# Patient Record
Sex: FEMALE | Race: WHITE | NOT HISPANIC OR LATINO | Employment: FULL TIME | URBAN - NONMETROPOLITAN AREA
[De-identification: names, ages, dates, MRNs, and addresses within clinical notes are randomized per-mention and may not be internally consistent; named-entity substitution may affect disease eponyms.]

---

## 2024-10-20 ENCOUNTER — HOSPITAL ENCOUNTER (EMERGENCY)
Facility: HOSPITAL | Age: 24
Discharge: HOME | End: 2024-10-20

## 2024-10-20 VITALS
RESPIRATION RATE: 16 BRPM | HEART RATE: 87 BPM | OXYGEN SATURATION: 100 % | HEIGHT: 67 IN | WEIGHT: 190 LBS | BODY MASS INDEX: 29.82 KG/M2 | TEMPERATURE: 97.1 F | SYSTOLIC BLOOD PRESSURE: 138 MMHG | DIASTOLIC BLOOD PRESSURE: 83 MMHG

## 2024-10-20 DIAGNOSIS — S61.011A LACERATION OF RIGHT THUMB WITHOUT FOREIGN BODY WITHOUT DAMAGE TO NAIL, INITIAL ENCOUNTER: Primary | ICD-10-CM

## 2024-10-20 PROCEDURE — 99283 EMERGENCY DEPT VISIT LOW MDM: CPT

## 2024-10-20 PROCEDURE — 12001 RPR S/N/AX/GEN/TRNK 2.5CM/<: CPT | Performed by: PHYSICIAN ASSISTANT

## 2024-10-20 ASSESSMENT — PAIN SCALES - GENERAL: PAINLEVEL_OUTOF10: 0 - NO PAIN

## 2024-10-20 ASSESSMENT — COLUMBIA-SUICIDE SEVERITY RATING SCALE - C-SSRS
1. IN THE PAST MONTH, HAVE YOU WISHED YOU WERE DEAD OR WISHED YOU COULD GO TO SLEEP AND NOT WAKE UP?: NO
6. HAVE YOU EVER DONE ANYTHING, STARTED TO DO ANYTHING, OR PREPARED TO DO ANYTHING TO END YOUR LIFE?: NO
2. HAVE YOU ACTUALLY HAD ANY THOUGHTS OF KILLING YOURSELF?: NO

## 2024-10-20 ASSESSMENT — PAIN - FUNCTIONAL ASSESSMENT: PAIN_FUNCTIONAL_ASSESSMENT: 0-10

## 2024-10-20 NOTE — DISCHARGE INSTRUCTIONS
Keep the wound clean and dry monitor for any signs of infection including increased redness swelling red streaking or increased pain    Change the dressing daily  Sutures out in 6 to 7 days

## 2024-10-20 NOTE — ED PROVIDER NOTES
HPI   Chief Complaint   Patient presents with   • Finger Laceration     States she cut her right thumb on an apple slicer about 30 minutes ago at home.        24-year-old female here with linear laceration along the right thumb cut it on an apple lacer earlier today last tetanus was 4 years ago    Denies any other injuries    Along the dorsal proximal aspect of the right thumb              Patient History   Past Medical History:   Diagnosis Date   • Crushing injury of unspecified finger(s), initial encounter 04/25/2018    Crushing injury of finger, right   • Fracture of unspecified phalanx of unspecified finger, initial encounter for closed fracture 05/23/2018    Nondisplaced fracture of phalanx of finger   • Ingrowing nail 12/17/2018    Ingrown left greater toenail   • Nondisplaced fracture of distal phalanx of right ring finger, initial encounter for closed fracture 04/30/2018    Closed nondisplaced fracture of distal phalanx of right ring finger, initial encounter   • Other conditions influencing health status 07/24/2013    Cellulitis Of The Left Toes   • Pediculosis due to pediculus humanus capitis 07/22/2014    Pediculosis capitis   • Pediculosis due to pediculus humanus capitis 06/07/2014    Head lice   • Personal history of diseases of the skin and subcutaneous tissue 10/21/2016    History of pityriasis rosea   • Personal history of diseases of the skin and subcutaneous tissue 06/08/2015    History of eczema   • Personal history of other infectious and parasitic diseases 07/19/2018    History of tinea corporis   • Unspecified contact dermatitis due to plants, except food 06/14/2014    Rhus dermatitis   • Unspecified injury of unspecified foot, initial encounter 04/18/2013    Foot injury     No past surgical history on file.  No family history on file.  Social History     Tobacco Use   • Smoking status: Not on file   • Smokeless tobacco: Not on file   Substance Use Topics   • Alcohol use: Not on file   • Drug  use: Not on file       Physical Exam   ED Triage Vitals [10/20/24 1532]   Temperature Heart Rate Respirations BP   36.2 °C (97.1 °F) 87 16 138/83      Pulse Ox Temp Source Heart Rate Source Patient Position   100 % Temporal Monitor Sitting      BP Location FiO2 (%)     Left arm --       Physical Exam  Vitals and nursing note reviewed.   Constitutional:       Appearance: Normal appearance.   HENT:      Head: Normocephalic and atraumatic.      Right Ear: External ear normal.      Left Ear: External ear normal.   Cardiovascular:      Rate and Rhythm: Normal rate.   Pulmonary:      Effort: Pulmonary effort is normal.      Breath sounds: Normal breath sounds.   Abdominal:      General: Abdomen is flat. Bowel sounds are normal.      Palpations: Abdomen is soft.   Musculoskeletal:      Comments: No bony tenderness with palp full range of motion against flexion and extension  Patient with 2 cm linear laceration to the dorsal proximal aspect of the right thumb   Skin:     General: Skin is warm and dry.      Capillary Refill: Capillary refill takes less than 2 seconds.      Comments: 2 cm linear laceration along the dorsal proximal aspect of the first phalanx on the right hand   Neurological:      General: No focal deficit present.      Mental Status: She is alert and oriented to person, place, and time.           ED Course & MDM   Diagnoses as of 10/20/24 1629   Laceration of right thumb without foreign body without damage to nail, initial encounter                 No data recorded                                 Medical Decision Making  Patient with linear laceration on her right thumb when she is attempting to use an apple slicer the plastic part broke and one of the metal parts that her finger causing her to cut her right dorsal finger  Full range of motion against resistance with flexion and extension  Wound cleansed with wound  and irrigated with normal saline 3 sutures placed 4.0  Patient tolerated  "well  Stressed close follow-up with primary doctor to keep the wound clean and dry  If she has any increased pain discomfort increased redness red streaking or other symptoms she needs to get rechecked      Differential diagnosis \"finger laceration finger fracture abrasion\"            Procedure  Laceration Repair    Performed by: Maribell Serrano PA-C  Authorized by: Maribell Serrano PA-C    Consent:     Consent obtained:  Verbal    Consent given by:  Patient    Risks, benefits, and alternatives were discussed: yes      Risks discussed:  Infection, pain and retained foreign body    Alternatives discussed:  No treatment, delayed treatment and observation  Universal protocol:     Procedure explained and questions answered to patient or proxy's satisfaction: yes      Immediately prior to procedure, a time out was called: yes      Patient identity confirmed:  Verbally with patient  Anesthesia:     Anesthesia method:  Nerve block    Block needle gauge:  27 G    Block anesthetic:  Lidocaine 1% w/o epi    Block technique:  Digital block right thumb    Block injection procedure:  Anatomic landmarks identified, introduced needle, incremental injection, anatomic landmarks palpated and negative aspiration for blood    Block outcome:  Anesthesia achieved  Laceration details:     Location:  Finger    Finger location:  R thumb    Length (cm):  2    Depth (mm):  3  Pre-procedure details:     Preparation:  Patient was prepped and draped in usual sterile fashion  Exploration:     Limited defect created (wound extended): no      Hemostasis achieved with:  Direct pressure    Imaging outcome: foreign body not noted      Wound exploration: wound explored through full range of motion      Wound extent: no fascia violation noted, no foreign bodies/material noted, no muscle damage noted, no nerve damage noted, no tendon damage noted, no underlying fracture noted and no vascular damage noted      Contaminated: no    Treatment:     Area cleansed " with:  Chlorhexidine    Amount of cleaning:  Standard    Irrigation solution:  Sterile saline    Irrigation volume:  20    Irrigation method:  Syringe    Visualized foreign bodies/material removed: no      Undermining:  None  Skin repair:     Repair method:  Sutures    Suture size:  4-0    Suture material:  Nylon    Suture technique:  Simple interrupted    Number of sutures:  3  Approximation:     Approximation:  Close  Repair type:     Repair type:  Simple  Post-procedure details:     Dressing:  Antibiotic ointment and bulky dressing    Procedure completion:  Tolerated       Maribell Serrano PA-C  10/20/24 1800

## 2024-10-27 ENCOUNTER — HOSPITAL ENCOUNTER (EMERGENCY)
Facility: HOSPITAL | Age: 24
Discharge: HOME | End: 2024-10-27

## 2024-10-27 VITALS
WEIGHT: 190 LBS | HEIGHT: 67 IN | RESPIRATION RATE: 16 BRPM | DIASTOLIC BLOOD PRESSURE: 93 MMHG | TEMPERATURE: 97.8 F | OXYGEN SATURATION: 100 % | BODY MASS INDEX: 29.82 KG/M2 | HEART RATE: 94 BPM | SYSTOLIC BLOOD PRESSURE: 145 MMHG

## 2024-10-27 DIAGNOSIS — Z48.02 VISIT FOR SUTURE REMOVAL: ICD-10-CM

## 2024-10-27 DIAGNOSIS — Z51.89 VISIT FOR WOUND CHECK: Primary | ICD-10-CM

## 2024-10-27 ASSESSMENT — COLUMBIA-SUICIDE SEVERITY RATING SCALE - C-SSRS
6. HAVE YOU EVER DONE ANYTHING, STARTED TO DO ANYTHING, OR PREPARED TO DO ANYTHING TO END YOUR LIFE?: NO
2. HAVE YOU ACTUALLY HAD ANY THOUGHTS OF KILLING YOURSELF?: NO
1. IN THE PAST MONTH, HAVE YOU WISHED YOU WERE DEAD OR WISHED YOU COULD GO TO SLEEP AND NOT WAKE UP?: NO

## 2024-10-27 ASSESSMENT — PAIN SCALES - GENERAL: PAINLEVEL_OUTOF10: 0 - NO PAIN

## 2024-10-27 ASSESSMENT — PAIN - FUNCTIONAL ASSESSMENT: PAIN_FUNCTIONAL_ASSESSMENT: 0-10

## 2025-05-28 ENCOUNTER — OFFICE VISIT (OUTPATIENT)
Dept: URGENT CARE | Facility: URGENT CARE | Age: 25
End: 2025-05-28
Payer: COMMERCIAL

## 2025-05-28 VITALS
OXYGEN SATURATION: 99 % | WEIGHT: 189.6 LBS | DIASTOLIC BLOOD PRESSURE: 89 MMHG | HEART RATE: 70 BPM | SYSTOLIC BLOOD PRESSURE: 127 MMHG | RESPIRATION RATE: 18 BRPM | TEMPERATURE: 98 F | BODY MASS INDEX: 29.69 KG/M2

## 2025-05-28 DIAGNOSIS — H60.11 CELLULITIS OF RIGHT EAR: Primary | ICD-10-CM

## 2025-05-28 RX ORDER — SULFAMETHOXAZOLE AND TRIMETHOPRIM 800; 160 MG/1; MG/1
1 TABLET ORAL 2 TIMES DAILY
Qty: 10 TABLET | Refills: 0 | Status: SHIPPED | OUTPATIENT
Start: 2025-05-28 | End: 2025-06-02

## 2025-05-28 NOTE — PATIENT INSTRUCTIONS
VISIT SUMMARY:  Today, you came in because your ear was swollen and irritated, and you suspected it might be due to insect bites. You noticed the swelling and small bumps on your ear yesterday morning, and it has worsened since then. You mentioned that the ear feels hot and irritated but not itchy. There is no history of new ear piercings, and you have no known allergies to antibiotics or history of diabetes.    YOUR PLAN:  -EAR INFECTION: Your ear swelling and irritation are likely due to an insect bite that has caused a secondary infection. This means that the bite has led to an infection in the area. To address this, you have been prescribed oral Bactrim, an antibiotic, to help reduce the swelling and treat the infection. Additionally, you should apply ice to the affected area to help reduce the swelling. Please monitor the swelling, and if it persists, return for reassessment.    INSTRUCTIONS:  Please take the prescribed oral Bactrim as directed. Apply ice to your ear to help reduce the swelling. If the swelling does not improve or gets worse, please return for a follow-up appointment.

## 2025-05-28 NOTE — PROGRESS NOTES
"Subjective   Patient ID: Dk Solitario is a 24 y.o. female who presents for Other (Skin issue on (R) ear, patient thinks she may have been bit by a spider 2 days ago. Red, irritated, feels \"hot and itchy.\" ).  History of Present Illness  Dk Solitario is a 24 year old female who presents with a swollen and irritated ear with suspected insect bites.    She noticed swelling and small bumps on her ear yesterday morning, which have worsened, prompting her to seek medical attention today. The ear feels hot and irritated but not itchy. She suspects a spider bite, although she does not recall being bitten. The bumps were not present before yesterday.    There is no history of new ear piercings. She has been touching the area, which may have contributed to the irritation. The swelling is limited to one ear, with no similar symptoms elsewhere on her body. No known allergies to antibiotics and no history of diabetes.    No itchiness, systemic symptoms, or other areas of swelling or bumps.    ROS is negative unless otherwise stated in HPI.       Objective     /89   Pulse 70   Temp 36.7 °C (98 °F) (Oral)   Resp 18   Wt 86 kg (189 lb 9.5 oz)   LMP 05/26/2025 (Exact Date)   SpO2 99%   Breastfeeding Unknown   BMI 29.69 kg/m²        VS: As documented in the triage note and EMR flowsheet from this visit was reviewed  General: Well appearing. No acute distress.   Eyes:  Extraocular movements grossly intact. No scleral icterus.   Head: Atraumatic. Normocephalic.     Neck: No meningismus. No gross masses. Full movement through range of motion  CV: Regular rhythm. No murmurs, rubs, gallops appreciated.   Resp: Clear to auscultation bilaterally. No respiratory distress.    Skin: Warm, dry. No rashes. 2 small papules to the right ear lobe with surrounding soft tissue edema, erythema and yellow/serous drainage.   Neuro: CN II-VII intact. A&O x3. Speech fluent. Alert. Moving all extremities. Ambulates with normal gait  Psych: " Appropriate mood and affect for situation        Point of Care Test & Imaging Results from this visit  No results found for this visit on 05/28/25.   Imaging  No results found.    Cardiology, Vascular, and Other Imaging  No other imaging results found for the past 2 days      Diagnostic study results (if any) were reviewed by Kylah Chun PA-C.    Assessment/Plan   Allergies, medications, history, and pertinent labs/EKGs/Imaging reviewed by Kylah Chun PA-C.     Assessment & Plan  Patient is a 24-year-old female presents with right sided ear swelling and irritation, concern for bug bite.  Vitals are stable.  On examination, patient has findings of 1-2 papules of the right earlobe with surrounding erythema, edema and yellow/serous drainage.  Findings are most consistent with likely insect bites with superimposed infection.  Discussed topical versus oral antibiotic.  On examination, the patient's right ear is significantly more swollen than the left therefore will pursue more aggressive treatment with oral Bactrim twice daily for the next 5 days. Patient informed of the diagnosis.  They are agreeable to the plan as discussed above.  Patient given the opportunity to ask questions.  All of the patient's questions were answered. Given precautions in which to seek attention in the emergency department. Discussed follow up with PCP or other appropriate clinician.      Orders and Diagnoses  Diagnoses and all orders for this visit:  Cellulitis of right ear  -     sulfamethoxazole-trimethoprim (Bactrim DS) 800-160 mg tablet; Take 1 tablet by mouth 2 times a day for 5 days.        Disposition:  Home      Kylah Chun PA-C     This medical note was created with the assistance of artificial intelligence (AI) for documentation purposes. The content has been reviewed and confirmed by the healthcare provider for accuracy and completeness. Patient consented to the use of audio recording and use of AI during their visit.

## 2025-05-29 ENCOUNTER — TELEPHONE (OUTPATIENT)
Dept: URGENT CARE | Facility: URGENT CARE | Age: 25
End: 2025-05-29